# Patient Record
Sex: FEMALE | Race: BLACK OR AFRICAN AMERICAN | NOT HISPANIC OR LATINO
[De-identification: names, ages, dates, MRNs, and addresses within clinical notes are randomized per-mention and may not be internally consistent; named-entity substitution may affect disease eponyms.]

---

## 2017-03-24 ENCOUNTER — RECORDS - HEALTHEAST (OUTPATIENT)
Dept: ADMINISTRATIVE | Facility: OTHER | Age: 52
End: 2017-03-24

## 2017-10-23 ENCOUNTER — RECORDS - HEALTHEAST (OUTPATIENT)
Dept: ADMINISTRATIVE | Facility: OTHER | Age: 52
End: 2017-10-23

## 2018-01-31 ENCOUNTER — RECORDS - HEALTHEAST (OUTPATIENT)
Dept: ADMINISTRATIVE | Facility: OTHER | Age: 53
End: 2018-01-31

## 2018-11-15 ENCOUNTER — RECORDS - HEALTHEAST (OUTPATIENT)
Dept: ADMINISTRATIVE | Facility: OTHER | Age: 53
End: 2018-11-15

## 2019-01-21 ENCOUNTER — RECORDS - HEALTHEAST (OUTPATIENT)
Dept: ADMINISTRATIVE | Facility: OTHER | Age: 54
End: 2019-01-21

## 2019-02-11 ENCOUNTER — RECORDS - HEALTHEAST (OUTPATIENT)
Dept: ADMINISTRATIVE | Facility: OTHER | Age: 54
End: 2019-02-11

## 2019-03-11 ENCOUNTER — RECORDS - HEALTHEAST (OUTPATIENT)
Dept: ADMINISTRATIVE | Facility: OTHER | Age: 54
End: 2019-03-11

## 2019-06-11 ENCOUNTER — RECORDS - HEALTHEAST (OUTPATIENT)
Dept: ADMINISTRATIVE | Facility: OTHER | Age: 54
End: 2019-06-11

## 2019-06-18 ENCOUNTER — RECORDS - HEALTHEAST (OUTPATIENT)
Dept: ADMINISTRATIVE | Facility: OTHER | Age: 54
End: 2019-06-18

## 2019-06-27 ENCOUNTER — RECORDS - HEALTHEAST (OUTPATIENT)
Dept: ADMINISTRATIVE | Facility: OTHER | Age: 54
End: 2019-06-27

## 2019-07-11 ENCOUNTER — COMMUNICATION - HEALTHEAST (OUTPATIENT)
Dept: FAMILY MEDICINE | Facility: CLINIC | Age: 54
End: 2019-07-11

## 2019-07-11 ENCOUNTER — OFFICE VISIT - HEALTHEAST (OUTPATIENT)
Dept: FAMILY MEDICINE | Facility: CLINIC | Age: 54
End: 2019-07-11

## 2019-07-11 DIAGNOSIS — F33.9 RECURRENT MAJOR DEPRESSIVE DISORDER, REMISSION STATUS UNSPECIFIED (H): ICD-10-CM

## 2019-07-11 DIAGNOSIS — J30.89 ALLERGIC RHINITIS DUE TO OTHER ALLERGIC TRIGGER, UNSPECIFIED SEASONALITY: ICD-10-CM

## 2019-07-11 DIAGNOSIS — I10 HYPERTENSION, UNSPECIFIED TYPE: ICD-10-CM

## 2019-07-11 DIAGNOSIS — M19.012 OSTEOARTHRITIS OF LEFT SHOULDER, UNSPECIFIED OSTEOARTHRITIS TYPE: ICD-10-CM

## 2019-07-11 DIAGNOSIS — F43.10 PTSD (POST-TRAUMATIC STRESS DISORDER): ICD-10-CM

## 2019-07-11 DIAGNOSIS — M17.0 OSTEOARTHRITIS OF BOTH KNEES, UNSPECIFIED OSTEOARTHRITIS TYPE: ICD-10-CM

## 2019-07-11 DIAGNOSIS — G89.29 OTHER CHRONIC PAIN: ICD-10-CM

## 2019-07-11 DIAGNOSIS — D86.9 SARCOIDOSIS: ICD-10-CM

## 2019-07-11 RX ORDER — TRAMADOL HYDROCHLORIDE 50 MG/1
TABLET ORAL
Refills: 0 | Status: SHIPPED | COMMUNITY
Start: 2019-06-18

## 2019-07-11 RX ORDER — CITALOPRAM HYDROBROMIDE 40 MG/1
TABLET ORAL
Refills: 0 | Status: SHIPPED | COMMUNITY
Start: 2018-11-26

## 2019-07-11 RX ORDER — AZELASTINE HYDROCHLORIDE 0.5 MG/ML
SOLUTION/ DROPS OPHTHALMIC
Refills: 5 | Status: SHIPPED | COMMUNITY
Start: 2019-05-23

## 2019-07-11 ASSESSMENT — MIFFLIN-ST. JEOR: SCORE: 1597.29

## 2020-06-24 ENCOUNTER — AMBULATORY - HEALTHEAST (OUTPATIENT)
Dept: SURGERY | Facility: HOSPITAL | Age: 55
End: 2020-06-24

## 2020-06-24 DIAGNOSIS — Z11.59 ENCOUNTER FOR SCREENING FOR OTHER VIRAL DISEASES: ICD-10-CM

## 2020-07-11 ENCOUNTER — ANESTHESIA - HEALTHEAST (OUTPATIENT)
Dept: SURGERY | Facility: HOSPITAL | Age: 55
End: 2020-07-11

## 2020-07-13 ENCOUNTER — SURGERY - HEALTHEAST (OUTPATIENT)
Dept: SURGERY | Facility: HOSPITAL | Age: 55
End: 2020-07-13

## 2020-07-13 ASSESSMENT — MIFFLIN-ST. JEOR: SCORE: 1601.26

## 2020-07-23 ENCOUNTER — COMMUNICATION - HEALTHEAST (OUTPATIENT)
Dept: FAMILY MEDICINE | Facility: CLINIC | Age: 55
End: 2020-07-23

## 2020-07-23 DIAGNOSIS — I10 HYPERTENSION, UNSPECIFIED TYPE: ICD-10-CM

## 2021-01-20 ENCOUNTER — COMMUNICATION - HEALTHEAST (OUTPATIENT)
Dept: FAMILY MEDICINE | Facility: CLINIC | Age: 56
End: 2021-01-20

## 2021-01-20 DIAGNOSIS — I10 HYPERTENSION, UNSPECIFIED TYPE: ICD-10-CM

## 2021-05-26 ENCOUNTER — RECORDS - HEALTHEAST (OUTPATIENT)
Dept: FAMILY MEDICINE | Facility: CLINIC | Age: 56
End: 2021-05-26

## 2021-05-26 ENCOUNTER — RECORDS - HEALTHEAST (OUTPATIENT)
Dept: ADMINISTRATIVE | Facility: OTHER | Age: 56
End: 2021-05-26

## 2021-05-26 DIAGNOSIS — I10 HYPERTENSION, UNSPECIFIED TYPE: ICD-10-CM

## 2021-05-26 RX ORDER — CHLORTHALIDONE 25 MG/1
TABLET ORAL
Qty: 30 TABLET | Refills: 0 | Status: SHIPPED | OUTPATIENT
Start: 2021-05-26 | End: 2021-07-06

## 2021-05-30 NOTE — PROGRESS NOTES
Subjective: This patient comes in for the first time the clinic she was sent here from [facility].  She is been followed by Dr. Sarkis Marquez.    Past medical history surgeries none hospitalizations none.    She has had problems with her left shoulder moderate glenohumeral joint degenerative changes with some space loss.  She has been seen by orthopedist.    She also has problems with her bilateral knee pain worse on the right than the left she had some exostosis noted on x-rays.  She has significant valgus knee deformity bilaterally.    Patient has hypertension continues on chlorthalidone.  She is had previous sarcoidosis but has no ongoing symptoms now.    Family history she is the only child her mom  at age 35 of muscular dystrophy.  She is not sure about her father.    She has a history of PTSD, major depression and anxiety she takes citalopram.    Patient has no allergies to medications.    Patient has been taking pain medication including ibuprofen 800 mg 3 times daily as needed but has not been taking this recently    She also has been on tramadol 1-2 3 times daily as needed  and hydrocodone/acetaminophen 10/325 twice daily as needed.    Patient also has some allergies and uses fluticasone nasal spray.  That is been controlling her rhinitis symptoms.  Tobacco status: She  reports that she has been smoking cigarettes.  She has a 7.50 pack-year smoking history. She has never used smokeless tobacco.    There are no active problems to display for this patient.      Current Outpatient Medications   Medication Sig Dispense Refill     azelastine (OPTIVAR) 0.05 % ophthalmic solution INSTILL 1 DROP IN OU BID.  5     chlorthalidone (HYGROTEN) 25 MG tablet TAKE 1 TABLET BY MOUTH EVERY DAY FOR BLOOD PRESSURE 90 tablet 3     citalopram (CELEXA) 40 MG tablet TK 1/2 T PO D FOR 1 WEEK THEN . INCREASE TO 1   T  D  0     fluticasone propionate (FLONASE) 50 mcg/actuation nasal spray SHAKE WELL AND INSTILL 2 SPRAYS INTO EACH  "NOSTRILS QD 16 g 5     HYDROcodone-acetaminophen (NORCO )  mg per tablet TK 1 T PO BID PRN FOR CHRONIC P  0     ibuprofen (ADVIL,MOTRIN) 800 MG tablet TK 1 T PO  Q 8 HOURS WITH FOOD PRN PAIN 40 tablet 2     traMADol (ULTRAM) 50 mg tablet TK 1-2 TS PO Q 4-6 H PRN. MAX OF 8 A DAY  0     No current facility-administered medications for this visit.        ROS:   10 point review of systems positive as discussed above otherwise negative.    Patient has been seeing a psychiatrist does not been going to counseling recently.  It sounds like she may need a new psychiatrist I offered to refer her to a psychiatrist, and to counseling she wanted to hold off on that.    Objective:    /88 (Patient Site: Left Arm, Patient Position: Sitting, Cuff Size: Adult Large)   Pulse 98   Resp 18   Ht 5' 3.25\" (1.607 m)   Wt (!) 228 lb (103.4 kg)   BMI 40.07 kg/m    Body mass index is 40.07 kg/m .      General appearance no acute distress at rest    Vital signs are stable.    HEENT neck is supple oropharynx is clear pupils react normally    Lungs are clear no rales or rhonchi heart was regular S1-S2    Abdomen nontender no guarding or rebound    Patient has pain in through the left shoulder at the glenohumeral joint anteriorly she has pain at abduction at around 100 220 degrees and pain with external rotation.    Valgus deformity in her knees and has medial joint line pain no effusion.    Lower extremities without edema.    Skin was normal no rashes.    I ordered a BMP but she did not get that done.      Assessment:  1. Osteoarthritis of both knees, unspecified osteoarthritis type  Ambulatory referral to Pain Clinic    ibuprofen (ADVIL,MOTRIN) 800 MG tablet   2. Osteoarthritis of left shoulder, unspecified osteoarthritis type  Ambulatory referral to Pain Clinic    ibuprofen (ADVIL,MOTRIN) 800 MG tablet   3. Sarcoidosis     4. Recurrent major depressive disorder, remission status unspecified (H)     5. PTSD " (post-traumatic stress disorder)     6. Other chronic pain  Ambulatory referral to Pain Clinic   7. Hypertension, unspecified type  Basic Metabolic Panel    DISCONTINUED: chlorthalidone (HYGROTEN) 25 MG tablet   8. Allergic rhinitis due to other allergic trigger, unspecified seasonality  fluticasone propionate (FLONASE) 50 mcg/actuation nasal spray     Osteoarthritis both knees with valgus deformity.    Left shoulder osteoarthritis glenohumeral joint and rotator cuff limitations.    Previous sarcoid no active symptoms    Mental health disorder with recurrent depression and PTSD.    Hypertension controlled I did order a BMP she did not get that done refill on her chlorthalidone also fluticasone and ibuprofen.    Regarding her chronic pain issues we will have her see the pain clinic, also continue to see orthopedist as needed    Plan: As discussed above follow-up here in 2 to 3 months earlier if needed    This transcription uses voice recognition software, which may contain typographical errors.

## 2021-06-01 ENCOUNTER — RECORDS - HEALTHEAST (OUTPATIENT)
Dept: ADMINISTRATIVE | Facility: CLINIC | Age: 56
End: 2021-06-01

## 2021-06-03 VITALS — BODY MASS INDEX: 40.4 KG/M2 | WEIGHT: 228 LBS | HEIGHT: 63 IN

## 2021-06-04 VITALS — BODY MASS INDEX: 38.93 KG/M2 | HEIGHT: 64 IN | WEIGHT: 228 LBS

## 2021-06-09 NOTE — TELEPHONE ENCOUNTER
Called patient to schedule appt for follow up, patient declined at the moment. She just recently had a biopsy done and awaiting results of that procedure. She understood and stated that she will call back to schedule appt with provider at a later date. Postponing out 2 weeks and will try again if appt has not been made.

## 2021-06-09 NOTE — ANESTHESIA CARE TRANSFER NOTE
Last vitals:   Vitals:    07/13/20 0920   BP: (!) 187/101   Pulse: 79   Resp: 16   Temp: 36.3  C (97.3  F)   SpO2: 96%     Patient's level of consciousness is drowsy  Spontaneous respirations: yes  Maintains airway independently: yes  Dentition unchanged: yes  Oropharynx: oropharynx clear of all foreign objects    QCDR Measures:  ASA# 20 - Surgical Safety Checklist: WHO surgical safety checklist completed prior to induction    PQRS# 430 - Adult PONV Prevention: 4558F - Pt received => 2 anti-emetic agents (different classes) preop & intraop  ASA# 8 - Peds PONV Prevention: NA - Not pediatric patient, not GA or 2 or more risk factors NOT present  PQRS# 424 - Taylor-op Temp Management: 4559F - At least one body temp DOCUMENTED => 35.5C or 95.9F within required timeframe  PQRS# 426 - PACU Transfer Protocol: - Transfer of care checklist used  ASA# 14 - Acute Post-op Pain: ASA14B - Patient did NOT experience pain >= 7 out of 10

## 2021-06-09 NOTE — ANESTHESIA POSTPROCEDURE EVALUATION
Patient: Massiel Mcrae  Procedure(s):  COLD KNIFE CONE,ENDOCERVICAL CURETTAGE  Anesthesia type: MAC    Patient location: PACU  Last vitals:   Vitals Value Taken Time   /83 7/13/2020 10:30 AM   Temp 36.3  C (97.3  F) 7/13/2020  9:20 AM   Pulse 71 7/13/2020 10:37 AM   Resp 16 7/13/2020  9:20 AM   SpO2 94 % 7/13/2020 10:37 AM   Vitals shown include unvalidated device data.  Post vital signs: stable  Level of consciousness: awake and responds to simple questions  Post-anesthesia pain: pain controlled  Post-anesthesia nausea and vomiting: no  Pulmonary: unassisted, return to baseline  Cardiovascular: stable and blood pressure at baseline  Hydration: adequate  Anesthetic events: no    QCDR Measures:  ASA# 11 - Taylor-op Cardiac Arrest: ASA11B - Patient did NOT experience unanticipated cardiac arrest  ASA# 12 - Taylor-op Mortality Rate: ASA12B - Patient did NOT die  ASA# 13 - PACU Re-Intubation Rate: ASA13B - Patient did NOT require a new airway mgmt  ASA# 10 - Composite Anes Safety: ASA10A - No serious adverse event    Additional Notes: Hypertensive pre-op, within 20% of baseline. Otherwise asymptomatic.

## 2021-06-09 NOTE — TELEPHONE ENCOUNTER
Please contact this patient, she has not been in for a year, and never had her blood test (electrolytes and kidney function).    She needs a follow-up virtual visit with me, video preferred    Let her know that I filled 30 tablets only on the chlorthalidone

## 2021-06-14 NOTE — TELEPHONE ENCOUNTER
RN cannot approve Refill Request    RN can NOT refill this medication PCP messaged that patient is overdue for Labs and Office Visit. Last office visit: 7/11/2019 Collin Cameron MD Last Physical: Visit date not found Last MTM visit: Visit date not found Last visit same specialty: 7/11/2019 Collin Cameron MD.  Next visit within 3 mo: Visit date not found  Next physical within 3 mo: Visit date not found      Leana Bang, [facility] Triage/Med Refill 1/20/2021    Requested Prescriptions   Pending Prescriptions Disp Refills     chlorthalidone (HYGROTEN) 25 MG tablet [Pharmacy Med Name: CHLORTHALIDONE 25MG TABLETS] 30 tablet 0     Sig: TAKE 1 TABLET BY MOUTH EVERY DAY FOR BLOOD PRESSURE       Diuretics/Combination Diuretics Refill Protocol  Failed - 1/20/2021  1:19 PM        Failed - Visit with PCP or prescribing provider visit in past 12 months     Last office visit with prescriber/PCP: 7/11/2019 Collin Cameron MD OR same dept: Visit date not found OR same specialty: 7/11/2019 Collin Cameron MD  Last physical: Visit date not found Last MTM visit: Visit date not found   Next visit within 3 mo: Visit date not found  Next physical within 3 mo: Visit date not found  Prescriber OR PCP: Collin Cameron MD  Last diagnosis associated with med order: 1. Hypertension, unspecified type  - chlorthalidone (HYGROTEN) 25 MG tablet [Pharmacy Med Name: CHLORTHALIDONE 25MG TABLETS]; TAKE 1 TABLET BY MOUTH EVERY DAY FOR BLOOD PRESSURE  Dispense: 30 tablet; Refill: 0    If protocol passes may refill for 12 months if within 3 months of last provider visit (or a total of 15 months).             Failed - Serum Potassium in past 12 months      No results found for: LN-POTASSIUM          Failed - Serum Sodium in past 12 months      No results found for: LN-SODIUM          Failed - Serum Creatinine in past 12 months      No results found for: CREATININE          Passed - Blood pressure on file in past 12 months     BP  Readings from Last 1 Encounters:   07/13/20 176/84

## 2021-06-14 NOTE — TELEPHONE ENCOUNTER
Pt informed that if she needs a refill on this medication she will need to contact her PCP. I called the pharmacy and spoke to Lynn the pharmacy technician to cancel this refill request. Pt informed and she will contact her pharmacy to reach out to her new clinic which is [facility].

## 2021-06-25 NOTE — TELEPHONE ENCOUNTER
RN cannot approve Refill Request    RN can NOT refill this medication PCP messaged that patient is overdue for Labs and Office Visit. Last office visit: 7/11/2019 Collin Cameron MD Last Physical: Visit date not found Last MTM visit: Visit date not found Last visit same specialty: 7/11/2019 Collin Cameron MD.  Next visit within 3 mo: Visit date not found  Next physical within 3 mo: Visit date not found      Leana Bang, [facility] Triage/Med Refill 5/26/2021    Requested Prescriptions   Pending Prescriptions Disp Refills     chlorthalidone (HYGROTEN) 25 MG tablet [Pharmacy Med Name: CHLORTHALIDONE 25MG TABLETS] 90 tablet 0     Sig: TAKE 1 TABLET BY MOUTH EVERY DAY FOR BLOOD PRESSURE       Diuretics/Combination Diuretics Refill Protocol  Failed - 5/26/2021 12:12 PM        Failed - Visit with PCP or prescribing provider visit in past 12 months     Last office visit with prescriber/PCP: 7/11/2019 Collin Cameron MD OR same dept: Visit date not found OR same specialty: 7/11/2019 Collin Cameron MD  Last physical: Visit date not found Last MTM visit: Visit date not found   Next visit within 3 mo: Visit date not found  Next physical within 3 mo: Visit date not found  Prescriber OR PCP: Collin Cameron MD  Last diagnosis associated with med order: 1. Hypertension, unspecified type  - chlorthalidone (HYGROTEN) 25 MG tablet [Pharmacy Med Name: CHLORTHALIDONE 25MG TABLETS]; TAKE 1 TABLET BY MOUTH EVERY DAY FOR BLOOD PRESSURE  Dispense: 90 tablet; Refill: 0    If protocol passes may refill for 12 months if within 3 months of last provider visit (or a total of 15 months).             Failed - Serum Potassium in past 12 months      No results found for: LN-POTASSIUM          Failed - Serum Sodium in past 12 months      No results found for: LN-SODIUM          Failed - Serum Creatinine in past 12 months      No results found for: CREATININE          Passed - Blood pressure on file in past 12 months     BP  Readings from Last 1 Encounters:   07/13/20 176/84

## 2021-06-25 NOTE — TELEPHONE ENCOUNTER
RN cannot approve Refill Request    RN can NOT refill this medication PCP messaged that patient is overdue for Labs and Office Visit. Last office visit: 7/11/2019 Collin Cameron MD Last Physical: Visit date not found Last MTM visit: Visit date not found Last visit same specialty: 7/11/2019 Collin Cameron MD.  Next visit within 3 mo: Visit date not found  Next physical within 3 mo: Visit date not found      Leana Bang, [facility] Triage/Med Refill 5/26/2021    Requested Prescriptions   Pending Prescriptions Disp Refills     chlorthalidone (HYGROTEN) 25 MG tablet [Pharmacy Med Name: CHLORTHALIDONE 25MG TABLETS] 30 tablet 0     Sig: TAKE 1 TABLET BY MOUTH EVERY DAY FOR BLOOD PRESSURE       Diuretics/Combination Diuretics Refill Protocol  Failed - 5/26/2021  9:25 AM        Failed - Visit with PCP or prescribing provider visit in past 12 months     Last office visit with prescriber/PCP: 7/11/2019 Collin Cameron MD OR same dept: Visit date not found OR same specialty: 7/11/2019 Collin Cameron MD  Last physical: Visit date not found Last MTM visit: Visit date not found   Next visit within 3 mo: Visit date not found  Next physical within 3 mo: Visit date not found  Prescriber OR PCP: Collin Cameron MD  Last diagnosis associated with med order: 1. Hypertension, unspecified type  - chlorthalidone (HYGROTEN) 25 MG tablet [Pharmacy Med Name: CHLORTHALIDONE 25MG TABLETS]; TAKE 1 TABLET BY MOUTH EVERY DAY FOR BLOOD PRESSURE  Dispense: 30 tablet; Refill: 0    If protocol passes may refill for 12 months if within 3 months of last provider visit (or a total of 15 months).             Failed - Serum Potassium in past 12 months      No results found for: LN-POTASSIUM          Failed - Serum Sodium in past 12 months      No results found for: LN-SODIUM          Failed - Serum Creatinine in past 12 months      No results found for: CREATININE          Passed - Blood pressure on file in past 12 months     BP  Readings from Last 1 Encounters:   07/13/20 176/84

## 2021-07-04 ENCOUNTER — RECORDS - HEALTHEAST (OUTPATIENT)
Dept: FAMILY MEDICINE | Facility: CLINIC | Age: 56
End: 2021-07-04

## 2021-07-04 DIAGNOSIS — I10 HYPERTENSION, UNSPECIFIED TYPE: ICD-10-CM

## 2021-07-04 NOTE — TELEPHONE ENCOUNTER
Telephone Encounter by Karina Castillo RN at 7/4/2021 12:47 PM     Author: Karina Castillo RN Service: -- Author Type: Registered Nurse    Filed: 7/4/2021 12:48 PM Encounter Date: 7/4/2021 Status: Signed    : Karina Castillo RN (Registered Nurse)       RN cannot approve Refill Request    RN can NOT refill this medication No established PCP. Last office visit: 7/11/2019 Collin Cameron MD Last Physical: Visit date not found Last MTM visit: Visit date not found Last visit same specialty: 7/11/2019 Collin Cameron MD.  Next visit within 3 mo: Visit date not found  Next physical within 3 mo: Visit date not found      Karina M Jonathan, [facility] Triage/Med Refill 7/4/2021    Requested Prescriptions   Pending Prescriptions Disp Refills   ? chlorthalidone (HYGROTEN) 25 MG tablet [Pharmacy Med Name: CHLORTHALIDONE 25MG TABLETS] 30 tablet 0     Sig: TAKE 1 TABLET BY MOUTH EVERY DAY FOR BLOOD PRESSURE       Diuretics/Combination Diuretics Refill Protocol  Failed - 7/4/2021 11:24 AM        Failed - Visit with PCP or prescribing provider visit in past 12 months     Last office visit with prescriber/PCP: 7/11/2019 Collin Cameron MD OR same dept: Visit date not found OR same specialty: 7/11/2019 Collin Cameron MD  Last physical: Visit date not found Last MTM visit: Visit date not found   Next visit within 3 mo: Visit date not found  Next physical within 3 mo: Visit date not found  Prescriber OR PCP: Collin Cameron MD  Last diagnosis associated with med order: 1. Hypertension, unspecified type  - chlorthalidone (HYGROTEN) 25 MG tablet [Pharmacy Med Name: CHLORTHALIDONE 25MG TABLETS]; TAKE 1 TABLET BY MOUTH EVERY DAY FOR BLOOD PRESSURE  Dispense: 30 tablet; Refill: 0    If protocol passes may refill for 12 months if within 3 months of last provider visit (or a total of 15 months).             Failed - Serum Potassium in past 12 months      No results found for: LN-POTASSIUM          Failed - Serum  Sodium in past 12 months      No results found for: LN-SODIUM          Failed - Serum Creatinine in past 12 months      No results found for: CREATININE          Passed - Blood pressure on file in past 12 months     BP Readings from Last 1 Encounters:   07/13/20 176/84

## 2021-07-06 RX ORDER — CHLORTHALIDONE 25 MG/1
TABLET ORAL
Qty: 7 TABLET | Refills: 0 | Status: SHIPPED | OUTPATIENT
Start: 2021-07-06

## 2021-07-06 NOTE — TELEPHONE ENCOUNTER
Telephone Encounter by Clarissa Patel MA at 7/6/2021  7:35 AM     Author: Clarissa Patel MA Service: -- Author Type: Medical Assistant    Filed: 7/6/2021  7:35 AM Encounter Date: 7/4/2021 Status: Signed    : Clarissa Patel MA (Medical Assistant)       Medication refill requested. Please authorize medication if appropriate.

## 2021-10-25 NOTE — ANESTHESIA PREPROCEDURE EVALUATION
Anesthesia Evaluation      Patient summary reviewed   No history of anesthetic complications     Airway   Mallampati: III  Neck ROM: full   Pulmonary     breath sounds clear to auscultation  (+) sleep apnea (+ risk factors, but per patient, she underwent a sleep study that was negative. ), decreased breath sounds, a smoker    ROS comment: PFTs 9/2/2015:    COMPLETE PULMONARY FUNCTION TEST     Study meets ATS criteria.     Spirometry shows a preserved FEV1/FVC ratio at 81. FEV1 is 2.25 liters and 99%. FVC is 2.78 liters and 98%. Lung volumes are within normal limits. Total lung capacity is 90% of predicted. Diffusion is mildly reduced and is 61% of predicted. When adjusted for hemoglobin, hemoglobin is 11.6, this is 62% of predicted. Flow volume loop configuration appears normal. Normal complete PFT.                          Cardiovascular   Exercise tolerance: > or = 4 METS  (+) hypertension, ,     ECG reviewed  Rhythm: regular  Rate: normal,         Neuro/Psych    (+) anxiety/panic attacks, chronic pain  (-) no seizures, no CVA    Endo/Other    (+) obesity (BMI 39.75),   (-) no diabetes, hypothyroidism, not pregnant     GI/Hepatic/Renal    (+) GERD,        Other findings: Sarcoidosis, per patient not currently taking any medications to treat     No Covid test available      Dental      Comment: Fair dentition, no loose or removable teeth                       Anesthesia Plan  Planned anesthetic: MAC  Propofol gtt  Fentanyl/ketamine prn  Dexamethasone 4 mg, ondansetron 4 mg   ASA 3     Anesthetic plan and risks discussed with: patient  Anesthesia plan special considerations: antiemetics,   Post-op plan: routine recovery           Calcipotriene Counseling:  I discussed with the patient the risks of calcipotriene including but not limited to erythema, scaling, itching, and irritation.
